# Patient Record
Sex: FEMALE | Race: WHITE | Employment: PART TIME | URBAN - METROPOLITAN AREA
[De-identification: names, ages, dates, MRNs, and addresses within clinical notes are randomized per-mention and may not be internally consistent; named-entity substitution may affect disease eponyms.]

---

## 2021-04-27 ENCOUNTER — APPOINTMENT (OUTPATIENT)
Dept: GENERAL RADIOLOGY | Age: 66
End: 2021-04-27
Payer: COMMERCIAL

## 2021-04-27 ENCOUNTER — HOSPITAL ENCOUNTER (EMERGENCY)
Age: 66
Discharge: HOME | End: 2021-04-27
Attending: EMERGENCY MEDICINE
Payer: COMMERCIAL

## 2021-04-27 VITALS
HEART RATE: 77 BPM | OXYGEN SATURATION: 99 % | WEIGHT: 130 LBS | TEMPERATURE: 98.4 F | RESPIRATION RATE: 16 BRPM | BODY MASS INDEX: 21.66 KG/M2 | DIASTOLIC BLOOD PRESSURE: 78 MMHG | HEIGHT: 65 IN | SYSTOLIC BLOOD PRESSURE: 151 MMHG

## 2021-04-27 DIAGNOSIS — S52.501A CLOSED FRACTURE OF DISTAL ENDS OF RIGHT RADIUS AND ULNA, INITIAL ENCOUNTER: Primary | ICD-10-CM

## 2021-04-27 DIAGNOSIS — S52.601A CLOSED FRACTURE OF DISTAL ENDS OF RIGHT RADIUS AND ULNA, INITIAL ENCOUNTER: Primary | ICD-10-CM

## 2021-04-27 PROCEDURE — 73110 X-RAY EXAM OF WRIST: CPT

## 2021-04-27 PROCEDURE — 99283 EMERGENCY DEPT VISIT LOW MDM: CPT

## 2021-04-27 PROCEDURE — 25605 CLTX DST RDL FX/EPHYS SEP W/: CPT

## 2021-04-27 RX ORDER — LIDOCAINE HYDROCHLORIDE 20 MG/ML
5 INJECTION, SOLUTION INFILTRATION; PERINEURAL ONCE
Status: DISCONTINUED | OUTPATIENT
Start: 2021-04-27 | End: 2021-04-28 | Stop reason: HOSPADM

## 2021-04-27 ASSESSMENT — PAIN SCALES - GENERAL: PAINLEVEL_OUTOF10: 4

## 2021-04-27 NOTE — ED PROVIDER NOTES
[address]  EMERGENCY DEPARTMENT ENCOUNTER      Pt Name: Alexis Russ  MRN: [id number]  [place] 1955  Date of evaluation: 4/27/21      CHIEF COMPLAINT       Chief Complaint   Patient presents with    Wrist Injury     HISTORY OF PRESENT ILLNESS   HPI 77 y.o. female was doing yard work at her house. Fell backwards on an outstretched r. Hand. Happened today about 4:45pm. Pain rated as moderate in severity, constant in duration. Did not have any medications prior to arrival.  She denies any other injury. REVIEW OF SYSTEMS     Review of Systems   Musculoskeletal: Positive for arthralgias. Skin: Negative for rash and wound. Neurological: Negative for weakness and numbness. PAST MEDICAL HISTORY   History reviewed. No pertinent past medical history. SURGICAL HISTORY     History reviewed. No pertinent surgical history. CURRENT MEDICATIONS       There are no discharge medications for this patient. ALLERGIES     has No Known Allergies. FAMILY HISTORY     has no family status information on file. SOCIAL HISTORY      reports that she has never smoked. She has never used smokeless tobacco. She reports that she does not drink alcohol or use drugs. PHYSICAL EXAM     INITIAL VITALS: BP (!) 151/78   Pulse 77   Temp 98.4 °F (36.9 °C) (Oral)   Resp 16   Ht 5' 5\" (1.651 m)   Wt 130 lb (59 kg)   SpO2 99%   BMI 21.63 kg/m²   General: NAD  Cardiovascular: RRR  Pulmonary: CTA  Skin: No rash or wound  MSK: There is obvious dorsal angulation of the right distal radius. Extremities radial pulses intact  Neuro: Patient has intact sensation of the median radial and ulnar dermatomes. She is able to move all of her digits appropriately. MEDICAL DECISION MAKING:     [facility] and emergency department course  77 y.o. female presenting with a fall on an outstretched hand with an obvious distal radius fracture.   X-ray obtained and shows a impacted distal radius fracture and also a fracture of the ulnar styloid. A hematoma block was performed. Manual reduction attempted. Repeat x-ray does show improvement but persistent dorsal angulation. Patient provided outpatient follow-up with orthopedic surgery. After application of sugar tong splint to the r. forearm by RN, Post application exam shows:   cap refill less than 2 seconds  there is no swelling or discoloration  Sensation intact and able to move distally  Splint is appropriate     D/w pt treatment plan, warning precautions for prompt ED return and importance of close OP FU, she verbalizes understanding and agrees with the treatment plan. DIAGNOSTIC RESULTS     RADIOLOGY:All plain film, CT, MRI, and formal ultrasound images (except ED bedside ultrasound) are read by the radiologist and the images and interpretations are directly viewed by the emergency physician. XR WRIST RIGHT (MIN 3 VIEWS)   Final Result   Slightly decreased distal dorsal angulation status post reduction and casting   of the comminuted impacted distal radius fracture. Unchanged alignment of the displaced ulnar styloid fracture. XR WRIST RIGHT (MIN 3 VIEWS)   Final Result   Mildly impacted fracture of the distal radius with mild dorsal displacement   and comminution. Also a fracture of the ulna styloid           EMERGENCY DEPARTMENT COURSE:   Vitals:    Vitals:    04/27/21 1758   BP: (!) 151/78   Pulse: 77   Resp: 16   Temp: 98.4 °F (36.9 °C)   TempSrc: Oral   SpO2: 99%   Weight: 130 lb (59 kg)   Height: 5' 5\" (1.651 m)       The patient was given the following medications while in the emergency department:  Orders Placed This Encounter   Medications    DISCONTD: lidocaine 2 % injection 5 mL     -------------------------  CRITICAL CARE:   CONSULTS: None  PROCEDURES: Ortho Injury    Date/Time: 4/28/2021 3:44 PM  Performed by: Garrett Olmedo MD  Authorized by: Garrett Olmedo MD   Consent: Verbal consent obtained.   Risks and benefits: risks, benefits and alternatives were discussed  Consent given by: patient  Patient understanding: patient states understanding of the procedure being performed  Patient consent: the patient's understanding of the procedure matches consent given  Procedure consent: procedure consent matches procedure scheduled  Imaging studies: imaging studies available  Patient identity confirmed: verbally with patient  Time out: Immediately prior to procedure a \"time out\" was called to verify the correct patient, procedure, equipment, support staff and site/side marked as required. Injury location: wrist  Location details: right wrist  Injury type: fracture-dislocation  Pre-procedure neurovascular assessment: neurovascularly intact  Pre-procedure distal perfusion: normal  Pre-procedure neurological function: normal  Pre-procedure range of motion: reduced  Anesthesia: hematoma block    Anesthesia:  Local anesthesia used: yes  Local Anesthetic: lidocaine 1% without epinephrine  Anesthetic total: 5 mL    Sedation:  Patient sedated: no    Manipulation performed: yes  Skin traction used: yes  Reduction successful: partial.  X-ray confirmed reduction: yes  Immobilization: splint  Splint type: sugar tong  Supplies used: Ortho-Glass  Post-procedure neurovascular assessment: post-procedure neurovascularly intact  Post-procedure distal perfusion: normal  Post-procedure neurological function: normal  Post-procedure range of motion: unchanged  Patient tolerance: patient tolerated the procedure well with no immediate complications           FINAL IMPRESSION      1.  Closed fracture of distal ends of right radius and ulna, initial encounter          DISPOSITION/PLAN   DISPOSITION Decision To Discharge 04/27/2021 08:20:09 PM      PATIENT REFERRED TO:  Josh Chapman MD  [address] [place] [address].  [address] [phone]    In 3 days      [facility] ED  [place] [address]  [phone]    If symptoms worsen

## 2021-04-27 NOTE — ED TRIAGE NOTES
Mode of arrival (squad #, walk in, police, etc) : walk in        Chief complaint(s): Right wrist injury        Arrival Note (brief scenario, treatment PTA, etc). : Pt states she fell on uneven ground injuring her right wrist. There is a deformity noted. C= \"Have you ever felt that you should Cut down on your drinking? \"  No  A= \"Have people Annoyed you by criticizing your drinking? \"  No  G= \"Have you ever felt bad or Guilty about your drinking? \"  No  E= \"Have you ever had a drink as an Eye-opener first thing in the morning to steady your nerves or to help a hangover? \"  No      Deferred []      Reason for deferring: N/A    *If yes to two or more: probable alcohol abuse. *

## 2021-04-28 ENCOUNTER — TELEPHONE (OUTPATIENT)
Dept: ORTHOPEDIC SURGERY | Age: 66
End: 2021-04-28

## 2021-04-28 NOTE — TELEPHONE ENCOUNTER
[facility] an [facility] Patient is insured with [employer]. Patient was informed that [place] [address] Specialist Department is not contracted with [employer] [facility] that [facility] is contracted with [facility] [address]

## 2022-02-28 ENCOUNTER — TELEPHONE (OUTPATIENT)
Dept: PRIMARY CARE CLINIC | Age: 67
End: 2022-02-28

## 2022-02-28 NOTE — TELEPHONE ENCOUNTER
----- Message from Kiaramahogany Mccall sent at 2022  5:02 PM EST -----  Subject: Appointment Request    Reason for Call: New Patient Request Appointment    QUESTIONS  Type of Appointment? New Patient/New to Provider  Reason for appointment request? No appointments available during search  Additional Information for Provider? Patient would like to establish care   with either Diana Collado or Carmin Kanner within the office. Patient   would like the office to follow up with her with this decision asap.   ---------------------------------------------------------------------------  --------------  [address]  What is the best way for the office to contact you? OK to leave message on   voicemail  Preferred Call Back Phone Number? [phone]  ---------------------------------------------------------------------------  --------------  SCRIPT ANSWERS  Relationship to Patient? Self  Specialty Confirmation? Primary Care  Is this the first appointment to establish care for a ? No  Have you been diagnosed with, awaiting test results for, or told that you   are suspected of having COVID-19 (Coronavirus)? (If patient has tested   negative or was tested as a requirement for work, school, or travel and   not based on symptoms, answer no)? No  Within the past 10 days have you developed any of the following symptoms   (answer no if symptoms have been present longer than 10 days or began   more than 10 days ago)? Fever or Chills, Cough, Shortness of breath or   difficulty breathing, Loss of taste or smell, Sore throat, Nasal   congestion, Sneezing or runny nose, Fatigue or generalized body aches   (answer no if pain is specific to a body part e.g. back pain), Diarrhea,   Headache? No  Have you had close contact with someone with COVID-19 in the last 7 days? No  (Service Expert  click yes below to proceed with [employer] As Usual   Scheduling)?  Yes

## 2022-05-16 ENCOUNTER — HOSPITAL ENCOUNTER (OUTPATIENT)
Dept: PHYSICAL THERAPY | Facility: CLINIC | Age: 67
Setting detail: THERAPIES SERIES
Discharge: HOME | End: 2022-05-16
Payer: COMMERCIAL

## 2022-05-16 PROCEDURE — 97161 PT EVAL LOW COMPLEX 20 MIN: CPT

## 2022-05-20 ENCOUNTER — HOSPITAL ENCOUNTER (OUTPATIENT)
Dept: PHYSICAL THERAPY | Facility: CLINIC | Age: 67
Setting detail: THERAPIES SERIES
Discharge: HOME | End: 2022-05-20
Payer: COMMERCIAL

## 2022-05-20 PROCEDURE — 97110 THERAPEUTIC EXERCISES: CPT

## 2022-05-20 NOTE — FLOWSHEET NOTE
[x] [address]    Outpatient Rehabilitation &  Therapy  [address] [facility]  P: [phone]  F: [phone]      Physical Therapy Daily Treatment Note    Date:  2022  Patient: Prudence Funk               : 1955                      MRN: [id number]  Physician: Sandoval Rebolledo MD                         Insurance: [employer] ([employer]  Medical Diagnosis: S92.001A (ICD-10-CM) - Fracture of right heel                         Rehab Codes: M25.474, M25.672, R26.2  Onset date: 22                                                     Next Dr's appt. : --  Visit# / total visits:      Cancels/No Shows:     Subjective:  States pain is very minimal, and has been like that from the start. Pain:  [x] Yes  [] No Location: R heel Pain Rating: (0-10 scale) 1/10  Pain altered Tx:  [x] No  [] Yes  Action:  Comments:    Todays Treatment:  Precautions: WBAT  Exercises:  Exercise  R Calcaneal Fx Reps/ Time Weight/ Level Comments   Nustep 8' L1                HS step S 3x30\"       SB S 3x30\"        Hip flexor step S 3x30\"                  Clamshells 2x10       SL hip abd 2x10        SLR 2x10                            Ankle pumps 2x10   2-3\" hold   Ankle alphabet 3x       Ankle circles 2x15                  4 way ankle 20x  lime                          Other:     Specific Instructions for next treatment: Continue tx per POC        Treatment Charges: Mins Units   []  Modalities     [x]  Ther Exercise 40 3   []  Manual Therapy     []  Ther Activities     []  Aquatics     []  Vasocompression     []  Other     Total Treatment time 40 3       Assessment: [x] Progressing toward goals. Initiated treatment with nustep for warmup followed by stretches and exercises per chart with good tolerance. Pt needing vocal cues to slow down with ankle exercises with fair carryover.  Cues also provided for eccentric control with 4 way ankle and decreased hip rotation with clamshells and hip abduction with good carryover. Provided pt with updated HEP as well as lime band with good understanding. Continue to progress as tolerable. [] No change. [] Other:      Goals  MET NOT MET ON-  GOING  Details   Date Addressed: NA           STG: To be met in 8 treatments  ?          1. ? ROM: Increase flexibility in R LE to help normalize LE mechanics for improved gait technique. []??  []??  []??      2. Pt will demonstrate R ankle DF equal to L in order to help normalize gait mechanics and reduce need for compensations with gait. []??  []??  []??      3. Independent with Home Exercise Programs []? ?  []??  []??      4. Demonstrate knowledge of fall risk prevention  []? ?  []??  []??        []? ?  []??  []??      Date Addressed: NA           LTG: To be met in 16 treatments           1. Improve score on assessment tool LEFS from 33% impairment to less than 23% impairment, demonstrating improved tolerance to activity.  []??  []??  []??      2. Reduce pain levels to 0/10 with all ambulation and  ADLs, demonstrating improved tolerance to activity. []??  []??  []??      3. ? Strength: Increase R LE strength to 5/5 grossly to help reduce need for compensations and to help improve LE stability. []??  []??  []??      4. Pt will demonstrate ability to ambulate with proper gait mechanics and no AD, returning to baseline mobility.                 Patient goals: \"Strengthen leg, normal ambulation\"        Pt. Education:  [x] Yes  [] No  [x] Reviewed Prior HEP/Ed  Method of Education: [x] Verbal  [x] Demo  [x] Written  Comprehension of Education:  [x] Verbalizes understanding. [x] Demonstrates understanding. [] Needs review. [] Demonstrates/verbalizes HEP/Ed previously given. Access Code: [email or web address]  URL: [email or web address]  Date: 05/20/2022  Prepared by: Gardenia Chris    Exercises  Supine Ankle Pumps - 3 x daily - 7 x weekly - 2 sets - 10 reps - 5 seconds hold  Seated Ankle Alphabet - 3 x daily - 7 x weekly - 3

## 2022-05-24 ENCOUNTER — APPOINTMENT (OUTPATIENT)
Dept: PHYSICAL THERAPY | Facility: CLINIC | Age: 67
End: 2022-05-24
Payer: COMMERCIAL

## 2022-05-26 ENCOUNTER — HOSPITAL ENCOUNTER (OUTPATIENT)
Dept: PHYSICAL THERAPY | Facility: CLINIC | Age: 67
Setting detail: THERAPIES SERIES
Discharge: HOME | End: 2022-05-26
Payer: COMMERCIAL

## 2022-05-26 PROCEDURE — 97110 THERAPEUTIC EXERCISES: CPT

## 2022-05-26 NOTE — FLOWSHEET NOTE
[address]  [address] [facility]  Phone: [phone]  Fax: [phone]    Physical Therapy Daily Treatment Note    Date:  2022  Patient: Eriberto Cool               : 1955                      MRN: [id number]  Physician: Thomas Pak MD                         Insurance: [employer] ([employer]  Medical Diagnosis: S92.001A- Fracture of right heel                         Rehab Codes: M25.474, M25.672, R26.2  Onset date: 22                                                     Next Dr's appt. : --  Visit# / total visits: 3/16     Cancels/No Shows: 0/0     Subjective:  Pt reporting no pain upon arrival today, notes that she has been walking better since her evaluation. Pain:  [] Yes  [x] No  Location: R heel Pain Rating: (0-10 scale) 0/10  Pain altered Tx:  [x] No  [] Yes  Action:  Comments:    Todays Treatment:  Precautions: WBAT  Exercises:  Exercise  R Calcaneal Fx Reps/ Time Weight/ Level Comments    Nustep 6' L1    x              HS step S 3x30\"     x   SB S 3x30\"      x   Hip flexor step S 3x30\"      x              Clamshells 2x10  [place]   x   SL hip abd 2x10   [place]   x   SLR 2x10   [place]   x    Bridges 2x10, 5\"  [place]    x              Ankle pumps 2x10, 5\"    x   Ankle alphabet 3x     x   Ankle circles 2x15      x              4 way ankle 20x  lime    x               Heel raises          Other:     Specific Instructions for next treatment: Continue tx per POC        Treatment Charges: Mins Units   []  Modalities     [x]  Ther Exercise 43 3   []  Manual Therapy     []  Ther Activities     []  Aquatics     []  Vasocompression     []  Other     Total Treatment time 43 3       Assessment: [x] Progressing toward goals. Continued with program per log above with good tolerance. Progressed program with added resistance with mat exercises with good tolerance. Pt with good technique during completion and good recall.  Continued with ankle ROM exercises and 4 way ankle to continue working on pt's strength and ROM with pt denying any pain with exercises. Continue to progress pt as tolerated. [] No change. [] Other:      Goals  MET NOT MET ON-  GOING  Details   Date Addressed: NA           STG: To be met in 8 treatments  ?          1. ? ROM: Increase flexibility in R LE to help normalize LE mechanics for improved gait technique. []??  []??  []??      2. Pt will demonstrate R ankle DF equal to L in order to help normalize gait mechanics and reduce need for compensations with gait. []??  []??  []??      3. Independent with Home Exercise Programs []? ?  []??  []??      4. Demonstrate knowledge of fall risk prevention  []? ?  []??  []??        []? ?  []??  []??      Date Addressed: NA           LTG: To be met in 16 treatments           1. Improve score on assessment tool LEFS from 33% impairment to less than 23% impairment, demonstrating improved tolerance to activity.  []??  []??  []??      2. Reduce pain levels to 0/10 with all ambulation and  ADLs, demonstrating improved tolerance to activity. []??  []??  []??      3. ? Strength: Increase R LE strength to 5/5 grossly to help reduce need for compensations and to help improve LE stability. []??  []??  []??      4. Pt will demonstrate ability to ambulate with proper gait mechanics and no AD, returning to baseline mobility.                 Patient goals: \"Strengthen leg, normal ambulation\"        Pt. Education:  [x] Yes  [] No  [x] Reviewed Prior HEP/Ed  Method of Education: [x] Verbal  [x] Demo  [] Written  Comprehension of Education:  [x] Verbalizes understanding. [x] Demonstrates understanding. [x] Needs review. [] Demonstrates/verbalizes HEP/Ed previously given. Plan: [x] Continue with current plan of care towards goals. Time In: 1105            Time Out: [address]      Electronically signed by:   Demetriec Estrada, PT

## 2022-06-02 ENCOUNTER — HOSPITAL ENCOUNTER (OUTPATIENT)
Dept: PHYSICAL THERAPY | Facility: CLINIC | Age: 67
Setting detail: THERAPIES SERIES
Discharge: HOME | End: 2022-06-02
Payer: COMMERCIAL

## 2022-06-02 PROCEDURE — 97110 THERAPEUTIC EXERCISES: CPT

## 2022-06-02 NOTE — FLOWSHEET NOTE
[address] [facility]  Phone: [phone]  Fax: [phone]    Physical Therapy Daily Treatment Note    Date:  2022  Patient: Dottie Saldana               : 1955                      MRN: [id number]  Physician: Omi Zimmer MD                         Insurance: [employer] ([employer]  Medical Diagnosis: S92.001A- Fracture of right heel                         Rehab Codes: M25.474, M25.672, R26.2  Onset date: 22                                                     Next Dr's appt. : --  Visit# / total visits:      Cancels/No Shows: 0/0     Subjective:  Pt with very minimal pain this date. Notes she has to think about it and only when walking. Pain:  [x] Yes  [] No  Location: R heel Pain Rating: (0-10 scale) 1/10  Pain altered Tx:  [x] No  [] Yes  Action:  Comments:    Todays Treatment:  Precautions: WBAT  Exercises:  Exercise  R Calcaneal Fx Reps/ Time Weight/ Level Comments    Nustep 6' L1    x              HS step S 3x30\"     x   SB S 3x30\"      x   Hip flexor step S 3x30\"      x              Clamshells 2x10 [place]   x   SL hip abd 2x10  [place]   x   SLR 2x10  [place]   x   Bridges 2x10, 5\"  [place]    x              Ankle pumps 2x10, 5\"    x   Ankle alphabet 3x     x   Ankle circles 2x15      x              4 way ankle 20x  lime    x               Heel raises 2x10     x   3 way hip 15x orange R CKC x   Other:     Specific Instructions for next treatment: Continue tx per POC        Treatment Charges: Mins Units   []  Modalities     [x]  Ther Exercise 40 3   []  Manual Therapy     []  Ther Activities     []  Aquatics     []  Vasocompression     []  Other     Total Treatment time 40 3       Assessment: [x] Progressing toward goals. Progressed pt with adding in heel raises and 3 way hip for weight bearing on R LE with good tolerance. Pt noting soreness with new exercises but no pain.  Pt needing rest breaks during 3 way hip due to tightness and fatigue noted in R LE. Continued with mat work with good form and technique noted. [] No change. [] Other:      Goals  MET NOT MET ON-  GOING  Details   Date Addressed: NA           STG: To be met in 8 treatments  ?          1. ? ROM: Increase flexibility in R LE to help normalize LE mechanics for improved gait technique. []??  []??  []??      2. Pt will demonstrate R ankle DF equal to L in order to help normalize gait mechanics and reduce need for compensations with gait. []??  []??  []??      3. Independent with Home Exercise Programs []? ?  []??  []??      4. Demonstrate knowledge of fall risk prevention  []? ?  []??  []??        []? ?  []??  []??      Date Addressed: NA           LTG: To be met in 16 treatments           1. Improve score on assessment tool LEFS from 33% impairment to less than 23% impairment, demonstrating improved tolerance to activity.  []??  []??  []??      2. Reduce pain levels to 0/10 with all ambulation and  ADLs, demonstrating improved tolerance to activity. []??  []??  []??      3. ? Strength: Increase R LE strength to 5/5 grossly to help reduce need for compensations and to help improve LE stability. []??  []??  []??      4. Pt will demonstrate ability to ambulate with proper gait mechanics and no AD, returning to baseline mobility.                 Patient goals: \"Strengthen leg, normal ambulation\"        Pt. Education:  [x] Yes  [] No  [x] Reviewed Prior HEP/Ed  Method of Education: [x] Verbal  [x] Demo  [] Written  Comprehension of Education:  [x] Verbalizes understanding. [x] Demonstrates understanding. [x] Needs review. [] Demonstrates/verbalizes HEP/Ed previously given. Plan: [x] Continue with current plan of care towards goals.         Time In: 11:10 am            Time Out:  12:00 pm     Electronically signed by:  Fred Pineda PTA

## 2022-06-08 ENCOUNTER — HOSPITAL ENCOUNTER (OUTPATIENT)
Dept: PHYSICAL THERAPY | Facility: CLINIC | Age: 67
Setting detail: THERAPIES SERIES
Discharge: HOME | End: 2022-06-08
Payer: COMMERCIAL

## 2022-06-08 PROCEDURE — 97110 THERAPEUTIC EXERCISES: CPT

## 2022-06-08 NOTE — FLOWSHEET NOTE
3 way hip, as well as adding SLS to continue working on LE stability with good tolerance. Also able to add in foam step ups with pt struggling to maintain balance, as well as reporting an increase in fatigue following completion, but denies any increase in pain. Continue to progress pt as tolerated. [] No change. [] Other:      Goals  MET NOT MET ON-  GOING  Details   Date Addressed: NA           STG: To be met in 8 treatments  ?          1. ? ROM: Increase flexibility in R LE to help normalize LE mechanics for improved gait technique. []??  []??  []??      2. Pt will demonstrate R ankle DF equal to L in order to help normalize gait mechanics and reduce need for compensations with gait. []??  []??  []??      3. Independent with Home Exercise Programs []? ?  []??  []??      4. Demonstrate knowledge of fall risk prevention  []? ?  []??  []??        []? ?  []??  []??      Date Addressed: NA           LTG: To be met in 16 treatments           1. Improve score on assessment tool LEFS from 33% impairment to less than 23% impairment, demonstrating improved tolerance to activity.  []??  []??  []??      2. Reduce pain levels to 0/10 with all ambulation and  ADLs, demonstrating improved tolerance to activity. []??  []??  []??      3. ? Strength: Increase R LE strength to 5/5 grossly to help reduce need for compensations and to help improve LE stability. []??  []??  []??      4. Pt will demonstrate ability to ambulate with proper gait mechanics and no AD, returning to baseline mobility.                 Patient goals: \"Strengthen leg, normal ambulation\"        Pt. Education:  [x] Yes  [] No  [x] Reviewed Prior HEP/Ed  Method of Education: [x] Verbal  [x] Demo  [] Written  Comprehension of Education:  [x] Verbalizes understanding. [x] Demonstrates understanding. [x] Needs review. [] Demonstrates/verbalizes HEP/Ed previously given. Plan: [x] Continue with current plan of care towards goals.         Time In: 8189 Time Out:  1970     Electronically signed by:   Patrick Orantes, PT

## 2022-06-10 ENCOUNTER — HOSPITAL ENCOUNTER (OUTPATIENT)
Dept: PHYSICAL THERAPY | Facility: CLINIC | Age: 67
Setting detail: THERAPIES SERIES
Discharge: HOME | End: 2022-06-10
Payer: COMMERCIAL

## 2022-06-10 PROCEDURE — 97110 THERAPEUTIC EXERCISES: CPT

## 2022-06-10 NOTE — FLOWSHEET NOTE
[address] [facility]  Phone: [phone]  Fax: [phone]    Physical Therapy Daily Treatment Note    Date:  6/10/2022  Patient: Gilma Seaman               : 1955                      MRN: [id number]  Physician: Mekhi Hernandez MD                         Insurance: [place] [employer]  Medical Diagnosis: S92.001A- Fracture of right heel                         Rehab Codes: M25.474, M25.672, R26.2  Onset date: 22                                                     Next 's appt. : --  Visit# / total visits:      Cancels/No Shows: 0/0     Subjective:  Pt with no pain in R heel, but noting some pain in R knee when ambulating. Pt arrived 15 minutes late. Pain:  [] Yes  [x] No  Location: R heel Pain Rating: (0-10 scale) 0/10  Pain altered Tx:  [x] No  [] Yes  Action:  Comments:    Todays Treatment:  Precautions: WBAT  Exercises:  Exercise  R Calcaneal Fx Reps/ Time Weight/ Level Comments    Nustep 6' L13   x              HS step S 3x30\"     x   SB S 3x30\"      x   Hip flexor step S 3x30\"      x              Clamshells 2x10 [place]      SL hip abd 2x10  [place]      SLR 2x10  [place]      Bridges 2x10, 5\"  [place]                  Ankle pumps 2x10, 5\"       Ankle alphabet 3x        Ankle circles 2x15                    4 way ankle 3x10 [place]    x          SLS 3x30\"      x    Heel raises 3x10  3 way  x   3 way hip 2x10 [place] R CKC x   BOSU step ups 2x10   x   lunges 10x B   x          Other:     Specific Instructions for next treatment: Continue tx per POC        Treatment Charges: Mins Units   []  Modalities     [x]  Ther Exercise 40 3   []  Manual Therapy     []  Ther Activities     []  Aquatics     []  Vasocompression     []  Other     Total Treatment time 40 3       Assessment: [x] Progressing toward goals. Progressed pt with adding in BOSU for step ups, 3 way heel raise, and lunges with good tolerance.  Pt noting no increase in pain, just having soreness and fatigue. Pt noting lack of stablity roughly 50% of attempts for BOSU steps, as well as stretch with lunges. Continue to progress pt with WB activities. [] No change. [] Other:      Goals  MET NOT MET ON-  GOING  Details   Date Addressed: NA           STG: To be met in 8 treatments  ?          1. ? ROM: Increase flexibility in R LE to help normalize LE mechanics for improved gait technique. []??  []??  []??      2. Pt will demonstrate R ankle DF equal to L in order to help normalize gait mechanics and reduce need for compensations with gait. []??  []??  []??      3. Independent with Home Exercise Programs []? ?  []??  []??      4. Demonstrate knowledge of fall risk prevention  []? ?  []??  []??        []? ?  []??  []??      Date Addressed: NA           LTG: To be met in 16 treatments           1. Improve score on assessment tool LEFS from 33% impairment to less than 23% impairment, demonstrating improved tolerance to activity.  []??  []??  []??      2. Reduce pain levels to 0/10 with all ambulation and  ADLs, demonstrating improved tolerance to activity. []??  []??  []??      3. ? Strength: Increase R LE strength to 5/5 grossly to help reduce need for compensations and to help improve LE stability. []??  []??  []??      4. Pt will demonstrate ability to ambulate with proper gait mechanics and no AD, returning to baseline mobility.                 Patient goals: \"Strengthen leg, normal ambulation\"        Pt. Education:  [x] Yes  [] No  [x] Reviewed Prior HEP/Ed  Method of Education: [x] Verbal  [x] Demo  [] Written  Comprehension of Education:  [x] Verbalizes understanding. [x] Demonstrates understanding. [x] Needs review. [] Demonstrates/verbalizes HEP/Ed previously given. Plan: [x] Continue with current plan of care towards goals.         Time In: 11:10 am            Time Out:  11:50 am    Electronically signed by:  Lyssa Edwards PTA

## 2022-06-15 ENCOUNTER — HOSPITAL ENCOUNTER (OUTPATIENT)
Dept: PHYSICAL THERAPY | Facility: CLINIC | Age: 67
Setting detail: THERAPIES SERIES
Discharge: HOME | End: 2022-06-15
Payer: COMMERCIAL

## 2022-06-15 PROCEDURE — 97110 THERAPEUTIC EXERCISES: CPT

## 2022-06-15 NOTE — FLOWSHEET NOTE
[address] [facility]  Phone: [phone]  Fax: [phone]    Physical Therapy Daily Treatment Note    Date:  6/15/2022  Patient: Mikaela Jackson               : 1955                      MRN: [id number]  Physician: Leisa Bentley MD                         Insurance: [place] [employer] ([employer]  Medical Diagnosis: S92.001A- Fracture of right heel                         Rehab Codes: M25.474, M25.672, R26.2  Onset date: 22                                                     Next 's appt. : --  Visit# / total visits:      Cancels/No Shows: 0/0     Subjective:  Pt reporting with no pain today, although states that she has not done anything today. Pain:  [] Yes  [x] No  Location: R heel Pain Rating: (0-10 scale) 0/10  Pain altered Tx:  [x] No  [] Yes  Action:  Comments:    Todays Treatment:  Precautions: WBAT  Exercises:  Exercise  R Calcaneal Fx Reps/ Time Weight/ Level Comments    Nustep 6' L13   x              HS step S 3x30\"     x   SB S 3x30\"      x   Hip flexor step S 3x30\"      x              Clamshells 2x10 [place]      SL hip abd 2x10  [place]      SLR 2x10  [place]      Bridges 2x10, 5\"  [place]                  Ankle pumps 2x10, 5\"       Ankle alphabet 3x        Ankle circles 2x15                    4 way ankle 2x12 [place]    x          SLS 3x30\"      x    Heel raises 3x10  3 way  x   3 way hip 2x10 [place] R CKC x   BOSU step ups 2x15   x   lunges 2x10   x          Lunge mat S       Heel taps              Other:     Specific Instructions for next treatment: Continue tx per POC        Treatment Charges: Mins Units   []  Modalities     [x]  Ther Exercise 43 3   []  Manual Therapy     []  Ther Activities     []  Aquatics     []  Vasocompression     []  Other     Total Treatment time 43 3       Assessment: [x] Progressing toward goals.  Continued with progressions today with increased reps with lunges and BOSU step ups, as well as with increasing resistance with 3 way hip and 4 way ankle. Fair recall on most exercises, but some cueing still needed with 4 way ankle for proper recall and completion with good carryover following initial cueing. Pt with no reported pain following treatment. [] No change. [] Other:      Goals  MET NOT MET ON-  GOING  Details   Date Addressed: NA           STG: To be met in 8 treatments  ?          1. ? ROM: Increase flexibility in R LE to help normalize LE mechanics for improved gait technique. []??  []??  []??      2. Pt will demonstrate R ankle DF equal to L in order to help normalize gait mechanics and reduce need for compensations with gait. []??  []??  []??      3. Independent with Home Exercise Programs []? ?  []??  []??      4. Demonstrate knowledge of fall risk prevention  []? ?  []??  []??        []? ?  []??  []??      Date Addressed: NA           LTG: To be met in 16 treatments           1. Improve score on assessment tool LEFS from 33% impairment to less than 23% impairment, demonstrating improved tolerance to activity.  []??  []??  []??      2. Reduce pain levels to 0/10 with all ambulation and  ADLs, demonstrating improved tolerance to activity. []??  []??  []??      3. ? Strength: Increase R LE strength to 5/5 grossly to help reduce need for compensations and to help improve LE stability. []??  []??  []??      4. Pt will demonstrate ability to ambulate with proper gait mechanics and no AD, returning to baseline mobility.                 Patient goals: \"Strengthen leg, normal ambulation\"        Pt. Education:  [x] Yes  [] No  [x] Reviewed Prior HEP/Ed  Method of Education: [x] Verbal  [x] Demo  [] Written  Comprehension of Education:  [x] Verbalizes understanding. [x] Demonstrates understanding. [x] Needs review. [] Demonstrates/verbalizes HEP/Ed previously given. Plan: [x] Continue with current plan of care towards goals.         Time In: 0711            Time Out: 7719 [address]    Electronically signed by:   Gustavo Abrams PT

## 2022-06-17 ENCOUNTER — HOSPITAL ENCOUNTER (OUTPATIENT)
Dept: PHYSICAL THERAPY | Facility: CLINIC | Age: 67
Setting detail: THERAPIES SERIES
Discharge: HOME | End: 2022-06-17
Payer: COMMERCIAL

## 2022-06-17 PROCEDURE — 97110 THERAPEUTIC EXERCISES: CPT

## 2022-06-17 NOTE — PROGRESS NOTES
[address] [facility]  Phone: [phone]  Fax: [phone]    Physical Therapy Daily Treatment Note/ Progress Report     Date:  2022  Patient: Matthew Neri               : 1955                      MRN: [id number]  Physician: Marycruz uJarez MD                         Insurance: [place] [employer] ([employer]  Medical Diagnosis: S92.001A- Fracture of right heel                         Rehab Codes: M25.474, M25.672, R26.2  Onset date: 22                                                     Next 's appt. : --  Visit# / total visits:      Cancels/No Shows: 0/0     Subjective:  Pt arrives with no pain today, states that she is feeling pretty good. Pain:  [] Yes  [x] No  Location: R heel Pain Rating: (0-10 scale) 0/10  Pain altered Tx:  [x] No  [] Yes  Action:  Comments:    Todays Treatment:  Precautions: WBAT  Exercises:  Exercise  R Calcaneal Fx Reps/ Time Weight/ Level Comments    Nustep 6' L13   x              HS step S 3x30\"     x   SB S 3x30\"      x   Hip flexor step S 3x30\"      x              Clamshells 2x10 [place]      SL hip abd 2x10  [place]      SLR 2x10  [place]      Bridges 2x10, 5\"  [place]                             4 way ankle 2x12 [place]              SLS 3x30\"     Blue tband foam x    Heel raises 3x12  3 way  x   3 way hip 2x10 [place] R CKC    BOSU step ups 2x15   x   lunges 2x10             Lunge mat S 2x10, 5\"   x   Heel taps 2x10 4\"  x   Step ups 2x10 6\"  x   Other:     Specific Instructions for next treatment: Continue tx per POC        Treatment Charges: Mins Units   []  Modalities     [x]  Ther Exercise 42 3   []  Manual Therapy     []  Ther Activities     []  Aquatics     []  Vasocompression     []  Other     Total Treatment time 42 3       Assessment: [x] Progressing toward goals. Continued with log above with no issues.  Added lunge mat S, heel taps, and step ups to help address motion and stair navigation with pt reporting no issues, but notes that lunge S became easier with the more reps that she was completing. Also progressed balance program with addition of foam to SLS exercise with better challenge reported. Since starting therapy, pt reports that she has seen good improvements in her R foot/ankle. Pt notes that she has seen improved strength, improved ankle mobility, and improved tolerance to activity since starting therapy. At this time, pt still reports that she has difficulty with steps, as well as still noting that she feels that her gait mechanics are off and she still has a limp. Plan to continue with tx per POC to keep addressing stability, motion, and flexibility with pt in agreement with plan.    [] No change. [] Other:      Goals  MET NOT MET ON-  GOING  Details   Date Addressed: 6/17/22           STG: To be met in 8 treatments  ?          1. ? ROM: Increase flexibility in R LE to help normalize LE mechanics for improved gait technique. [x]? ?  []??  []??      2. Pt will demonstrate R ankle DF equal to L in order to help normalize gait mechanics and reduce need for compensations with gait. [x]? ?  []??  []??  7* DF    3. Independent with Home Exercise Programs [x]? ?  []??  []??      4. Demonstrate knowledge of fall risk prevention  [x]? ?  []??  []??        []? ?  []??  []??      Date Addressed: NA           LTG: To be met in 16 treatments           1. Improve score on assessment tool LEFS from 33% impairment to less than 23% impairment, demonstrating improved tolerance to activity.  []??  []??  []??      2. Reduce pain levels to 0/10 with all ambulation and  ADLs, demonstrating improved tolerance to activity. []??  []??  []??      3. ? Strength: Increase R LE strength to 5/5 grossly to help reduce need for compensations and to help improve LE stability. []??  []??  []??      4.  Pt will demonstrate ability to ambulate with proper gait mechanics and no AD, returning to baseline mobility.               Patient goals: \"Strengthen leg, normal ambulation\"        Pt. Education:  [x] Yes  [] No  [x] Reviewed Prior HEP/Ed  Method of Education: [x] Verbal  [x] Demo  [] Written  Comprehension of Education:  [x] Verbalizes understanding. [x] Demonstrates understanding. [x] Needs review. [] Demonstrates/verbalizes HEP/Ed previously given. Plan: [x] Continue with current plan of care towards goals. Time In: 4850            Time Out: 3848    Electronically signed by:   Shira Bence, PT

## 2022-06-24 ENCOUNTER — HOSPITAL ENCOUNTER (OUTPATIENT)
Dept: PHYSICAL THERAPY | Facility: CLINIC | Age: 67
Setting detail: THERAPIES SERIES
Discharge: HOME | End: 2022-06-24
Payer: COMMERCIAL

## 2022-06-24 PROCEDURE — 97110 THERAPEUTIC EXERCISES: CPT

## 2022-06-24 NOTE — PROGRESS NOTES
[address] [facility]  Phone: [phone]  Fax: [phone]    Physical Therapy Daily Treatment Note    Date:  2022  Patient: Prudence Funk               : 1955                      MRN: [id number]  Physician: Sandoval Rebolledo MD                         Insurance: [place] [employer] ([employer]  Medical Diagnosis: S92.001A- Fracture of right heel                         Rehab Codes: M25.474, M25.672, R26.2  Onset date: 22                                                     Next 's appt. : --  Visit# / total visits:      Cancels/No Shows: 0/0     Subjective:  Pt reporting feeling better and better. Pain:  [] Yes  [x] No  Location: R heel Pain Rating: (0-10 scale) 0/10  Pain altered Tx:  [x] No  [] Yes  Action:  Comments:    Todays Treatment:  Precautions: WBAT  Exercises:  Exercise  R Calcaneal Fx Reps/ Time Weight/ Level Comments    Nustep 6' L13                 HS step S 3x30\"     x   SB S 3x30\"      x   Hip flexor step S 3x30\"      x              Clamshells 2x10 [place]      SL hip abd 2x10  [place]      SLR 2x10  [place]      Bridges 2x10, 5\"  [place]                             4 way ankle 2x12 [place]              SLS 3x30\"     Blue tband foam x    Heel raises 3x12  3 way  x   3 way hip 2x15 [place] R CKC x   BOSU step ups 2x15   x   lunges 2x10   x   rebounder 20x 3.5#  x   Lunge mat S 2x10, 5\"   x   Heel taps 2x10 4\"  x   Step ups 2x15 6\" Eccentric lowering  x   Other:     Specific Instructions for next treatment: Continue tx per POC        Treatment Charges: Mins Units   []  Modalities     [x]  Ther Exercise 42 3   []  Manual Therapy     []  Ther Activities     []  Aquatics     []  Vasocompression     []  Other     Total Treatment time 42 3       Assessment: [x] Progressing toward goals. Added in rebounder this date with pt noting this being the most challenging of all exercises performed.  Pt needing cues for decreased stride length and knee over toes for improved control with poor carryover. Able to increase reps for exercises as well as add eccentric lowering to step ups with added difficulty noted. No increase in pain noted post treatment. Pt states she is too busy for therapy next week and will call back to schedule for the following week. [] No change. [] Other:      Goals  MET NOT MET ON-  GOING  Details   Date Addressed: 6/17/22           STG: To be met in 8 treatments  ?          1. ? ROM: Increase flexibility in R LE to help normalize LE mechanics for improved gait technique. [x]? ?  []??  []??      2. Pt will demonstrate R ankle DF equal to L in order to help normalize gait mechanics and reduce need for compensations with gait. [x]? ?  []??  []??  7* DF    3. Independent with Home Exercise Programs [x]? ?  []??  []??      4. Demonstrate knowledge of fall risk prevention  [x]? ?  []??  []??        []? ?  []??  []??      Date Addressed: NA           LTG: To be met in 16 treatments           1. Improve score on assessment tool LEFS from 33% impairment to less than 23% impairment, demonstrating improved tolerance to activity.  []??  []??  []??      2. Reduce pain levels to 0/10 with all ambulation and  ADLs, demonstrating improved tolerance to activity. []??  []??  []??      3. ? Strength: Increase R LE strength to 5/5 grossly to help reduce need for compensations and to help improve LE stability. []??  []??  []??      4. Pt will demonstrate ability to ambulate with proper gait mechanics and no AD, returning to baseline mobility.                 Patient goals: \"Strengthen leg, normal ambulation\"        Pt. Education:  [x] Yes  [] No  [x] Reviewed Prior HEP/Ed  Method of Education: [x] Verbal  [x] Demo  [] Written  Comprehension of Education:  [x] Verbalizes understanding. [x] Demonstrates understanding. [x] Needs review. [] Demonstrates/verbalizes HEP/Ed previously given.        Plan: [x] Continue with current plan of care towards goals.         Time In: 1:05 pm            Time Out: 1:45 pm    Electronically signed by:  Melba Jonas PTA